# Patient Record
Sex: FEMALE | Race: ASIAN
[De-identification: names, ages, dates, MRNs, and addresses within clinical notes are randomized per-mention and may not be internally consistent; named-entity substitution may affect disease eponyms.]

---

## 2021-02-24 ENCOUNTER — HOSPITAL ENCOUNTER (EMERGENCY)
Dept: HOSPITAL 12 - ER | Age: 26
Discharge: HOME | End: 2021-02-24
Payer: COMMERCIAL

## 2021-02-24 VITALS — HEIGHT: 62 IN | BODY MASS INDEX: 21.16 KG/M2 | WEIGHT: 115 LBS

## 2021-02-24 VITALS — SYSTOLIC BLOOD PRESSURE: 103 MMHG | DIASTOLIC BLOOD PRESSURE: 61 MMHG

## 2021-02-24 DIAGNOSIS — F17.200: ICD-10-CM

## 2021-02-24 DIAGNOSIS — R10.31: Primary | ICD-10-CM

## 2021-02-24 DIAGNOSIS — F31.9: ICD-10-CM

## 2021-02-24 DIAGNOSIS — F41.9: ICD-10-CM

## 2021-02-24 LAB
ALP SERPL-CCNC: 35 U/L (ref 50–136)
ALT SERPL W/O P-5'-P-CCNC: 55 U/L (ref 14–59)
APPEARANCE UR: CLEAR
AST SERPL-CCNC: 17 U/L (ref 15–37)
BASOPHILS # BLD AUTO: 0 K/UL (ref 0–8)
BASOPHILS NFR BLD AUTO: 0.5 % (ref 0–2)
BILIRUB DIRECT SERPL-MCNC: 0.1 MG/DL (ref 0–0.2)
BILIRUB SERPL-MCNC: 0.2 MG/DL (ref 0.2–1)
BILIRUB UR QL STRIP: NEGATIVE
BUN SERPL-MCNC: 15 MG/DL (ref 7–18)
CHLORIDE SERPL-SCNC: 103 MMOL/L (ref 98–107)
CO2 SERPL-SCNC: 24 MMOL/L (ref 21–32)
COLOR UR: YELLOW
CREAT SERPL-MCNC: 0.6 MG/DL (ref 0.6–1.3)
EOSINOPHIL # BLD AUTO: 0.1 K/UL (ref 0–0.7)
EOSINOPHIL NFR BLD AUTO: 2.1 % (ref 0–7)
GLUCOSE SERPL-MCNC: 91 MG/DL (ref 74–106)
GLUCOSE UR STRIP-MCNC: NEGATIVE MG/DL
HCG UR QL: NEGATIVE
HCT VFR BLD AUTO: 42 % (ref 31.2–41.9)
HGB BLD-MCNC: 14.2 G/DL (ref 10.9–14.3)
HGB UR QL STRIP: NEGATIVE
KETONES UR STRIP-MCNC: NEGATIVE MG/DL
LEUKOCYTE ESTERASE UR QL STRIP: NEGATIVE
LIPASE SERPL-CCNC: 83 U/L (ref 73–393)
LYMPHOCYTES # BLD AUTO: 2.4 K/UL (ref 20–40)
LYMPHOCYTES NFR BLD AUTO: 38.6 % (ref 20.5–51.5)
MCH RBC QN AUTO: 31 UUG (ref 24.7–32.8)
MCHC RBC AUTO-ENTMCNC: 34 G/DL (ref 32.3–35.6)
MCV RBC AUTO: 92 FL (ref 75.5–95.3)
MONOCYTES # BLD AUTO: 0.4 K/UL (ref 2–10)
MONOCYTES NFR BLD AUTO: 6.3 % (ref 0–11)
NEUTROPHILS # BLD AUTO: 3.2 K/UL (ref 1.8–8.9)
NEUTROPHILS NFR BLD AUTO: 52.5 % (ref 38.5–71.5)
NITRITE UR QL STRIP: NEGATIVE
PH UR STRIP: 6.5 [PH] (ref 5–8)
PLATELET # BLD AUTO: 286 K/UL (ref 179–408)
POTASSIUM SERPL-SCNC: 3.8 MMOL/L (ref 3.5–5.1)
RBC # BLD AUTO: 4.57 MIL/UL (ref 3.63–4.92)
SP GR UR STRIP: >=1.03 (ref 1–1.03)
UROBILINOGEN UR STRIP-MCNC: 0.2 E.U./DL
WBC # BLD AUTO: 6.2 K/UL (ref 3.8–11.8)
WS STN SPEC: 7.5 G/DL (ref 6.4–8.2)

## 2021-02-24 PROCEDURE — A4663 DIALYSIS BLOOD PRESSURE CUFF: HCPCS

## 2021-02-24 PROCEDURE — 36415 COLL VENOUS BLD VENIPUNCTURE: CPT

## 2021-02-24 PROCEDURE — 85025 COMPLETE CBC W/AUTO DIFF WBC: CPT

## 2021-02-24 PROCEDURE — 96375 TX/PRO/DX INJ NEW DRUG ADDON: CPT

## 2021-02-24 PROCEDURE — 80048 BASIC METABOLIC PNL TOTAL CA: CPT

## 2021-02-24 PROCEDURE — 76856 US EXAM PELVIC COMPLETE: CPT

## 2021-02-24 PROCEDURE — 84703 CHORIONIC GONADOTROPIN ASSAY: CPT

## 2021-02-24 PROCEDURE — 85730 THROMBOPLASTIN TIME PARTIAL: CPT

## 2021-02-24 PROCEDURE — 83690 ASSAY OF LIPASE: CPT

## 2021-02-24 PROCEDURE — 81003 URINALYSIS AUTO W/O SCOPE: CPT

## 2021-02-24 PROCEDURE — 74176 CT ABD & PELVIS W/O CONTRAST: CPT

## 2021-02-24 PROCEDURE — 80076 HEPATIC FUNCTION PANEL: CPT

## 2021-02-24 PROCEDURE — 99285 EMERGENCY DEPT VISIT HI MDM: CPT

## 2021-02-24 PROCEDURE — 96374 THER/PROPH/DIAG INJ IV PUSH: CPT

## 2021-02-24 PROCEDURE — 96361 HYDRATE IV INFUSION ADD-ON: CPT

## 2021-02-24 NOTE — NUR
Patient discharged to home in stable condition.  Written and verbal after care 
instructions given. 

Patient verbalizes understanding of instructions. Stressed follow up or return 
to ER for worsening s/s.PT WALKS IN STEADY GAIT. PT CALLED BOYFRIEND TO COME 
AND  THE PT. PT SAYS FEELS BETTER, PT UNDERSTANDS THAT SHE NEEDS TO COME 
BACK IF PAIN IS NOT RELIEVED.

## 2021-02-24 NOTE — NUR
PT TO [facility] FOR CT SCAN. PT HAS ABDOMINAL PAIN 5/10 AT THIS 
TIME. PT REFUSES PAIN MED AT THIS TIME.